# Patient Record
Sex: FEMALE | Race: WHITE
[De-identification: names, ages, dates, MRNs, and addresses within clinical notes are randomized per-mention and may not be internally consistent; named-entity substitution may affect disease eponyms.]

---

## 2018-12-01 ENCOUNTER — HOSPITAL ENCOUNTER (EMERGENCY)
Dept: HOSPITAL 25 - UCEAST | Age: 38
Discharge: HOME | End: 2018-12-01
Payer: COMMERCIAL

## 2018-12-01 VITALS — DIASTOLIC BLOOD PRESSURE: 81 MMHG | SYSTOLIC BLOOD PRESSURE: 134 MMHG

## 2018-12-01 DIAGNOSIS — F17.210: ICD-10-CM

## 2018-12-01 DIAGNOSIS — B34.9: Primary | ICD-10-CM

## 2018-12-01 DIAGNOSIS — J02.9: ICD-10-CM

## 2018-12-01 PROCEDURE — 36415 COLL VENOUS BLD VENIPUNCTURE: CPT

## 2018-12-01 PROCEDURE — 86665 EPSTEIN-BARR CAPSID VCA: CPT

## 2018-12-01 PROCEDURE — 99211 OFF/OP EST MAY X REQ PHY/QHP: CPT

## 2018-12-01 PROCEDURE — G0463 HOSPITAL OUTPT CLINIC VISIT: HCPCS

## 2018-12-01 PROCEDURE — 86308 HETEROPHILE ANTIBODY SCREEN: CPT

## 2018-12-01 PROCEDURE — 87070 CULTURE OTHR SPECIMN AEROBIC: CPT

## 2018-12-01 PROCEDURE — 86664 EPSTEIN-BARR NUCLEAR ANTIGEN: CPT

## 2018-12-01 NOTE — UC
Throat Pain/Nasal Ariel HPI





- HPI Summary


HPI Summary: 


sore throat on off for 2-3 weeks --no fevers some cough is concerned because 

son has had similar symptoms for about 5 weeks and they just got a note from 

the school nurse that a child in the school has mono








- History of Current Complaint


Chief Complaint: UCGeneralIllness


Stated Complaint: THROAT PAIN


Time Seen by Provider: 12/01/18 12:37


Hx Obtained From: Patient


Hx Last Menstrual Period: 2 weeksago


Pregnant?: No


Onset/Duration: Gradual Onset, Lasting Weeks - 2-3, Still Present


Severity: Moderate


Pain Intensity: 5


Pain Scale Used: 0-10 Numeric


Cough: Nonproductive





- Allergies/Home Medications


Allergies/Adverse Reactions: 


 Allergies











Allergy/AdvReac Type Severity Reaction Status Date / Time


 


No Known Allergies Allergy   Verified 12/01/18 12:54














PMH/Surg Hx/FS Hx/Imm Hx


Previously Healthy: Yes





- Surgical History


Surgical History: Yes


Surgery Procedure, Year, and Place: APPENDECTOMY





- Family History


Known Family History: Positive: None - noncontributory





- Social History


Occupation: Employed Full-time


Lives: With Family


Alcohol Use: Occasionally


Substance Use Type: None


Smoking Status (MU): Current Every Day Smoker


Type: Cigarettes


Amount Used/How Often: 1 PPD


Length of Time of Smoking/Using Tobacco: 20


Have You Smoked in the Last Year: Yes





Review of Systems


All Other Systems Reviewed And Are Negative: Yes


Constitutional: Positive: Negative


Skin: Positive: Negative


Eyes: Positive: Negative


ENT: Positive: Sore Throat


Respiratory: Positive: Cough


Cardiovascular: Positive: Negative


Gastrointestinal: Positive: Negative


Genitourinary: Positive: Negative


Motor: Positive: Negative


Neurovascular: Positive: Negative


Musculoskeletal: Positive: Negative


Neurological: Positive: Negative


Psychological: Positive: Negative


Is Patient Immunocompromised?: No





Physical Exam


Triage Information Reviewed: Yes


Appearance: Well-Appearing, No Pain Distress, Well-Nourished


Vital Signs: 


 Initial Vital Signs











Temp  97.1 F   12/01/18 12:49


 


Pulse  72   12/01/18 12:49


 


Resp  14   12/01/18 12:49


 


BP  134/81   12/01/18 12:49


 


Pulse Ox  97   12/01/18 12:49











Vital Signs Reviewed: Yes


Eye Exam: Normal


Eyes: Positive: Conjunctiva Clear


ENT Exam: Normal


ENT: Positive: Normal ENT inspection, Hearing grossly normal, Pharynx normal, 

TMs normal, Uvula midline.  Negative: Nasal congestion, Tonsillar swelling, 

Tonsillar exudate, Trismus, Muffled voice, Hoarse voice, Dental tenderness, 

Sinus tenderness


Dental Exam: Normal


Neck exam: Normal


Neck: Positive: Supple, Nontender


Respiratory Exam: Normal


Respiratory: Positive: Chest non-tender, Lungs clear, Normal breath sounds, No 

respiratory distress, No accessory muscle use


Cardiovascular Exam: Normal


Cardiovascular: Positive: RRR, No Murmur, Pulses Normal, Brisk Capillary Refill


Musculoskeletal Exam: Normal


Musculoskeletal: Positive: Strength Intact, ROM Intact, No Edema


Neurological Exam: Normal


Neurological: Positive: Alert, Muscle Tone Normal


Psychological Exam: Normal


Skin Exam: Normal





Throat Pain/Nasal Course/Dx





- Course


Assessment/Plan: mono test, full throat culture, increase fluids tylenol, 

ibuprofen follow with pcp prn





- Differential Dx/Diagnosis


Provider Diagnosis: 


 Pharyngitis with viral syndrome, Nicotine dependence








Discharge





- Sign-Out/Discharge


Documenting (check all that apply): Patient Departure


All imaging exams completed and their final reports reviewed: No Studies





- Discharge Plan


Condition: Stable


Disposition: HOME


Patient Education Materials:  Pharyngitis (ED), Viral Syndrome (ED)


Referrals: 


Christiano Alejandre MD [Primary Care Provider] - If Needed





- Billing Disposition and Condition


Condition: STABLE


Disposition: Home

## 2018-12-03 NOTE — PN
Progress Note





- Progress Note


Date of Service: 12/03/18


Note: 


monospot negative. please contact patient to tell of result.

## 2018-12-04 NOTE — UC
- Progress Note


Progress Note: 





EBV IGM positive despite monospot negative.


This indicates that she has a mono-like viral infection. Please refrain from 

contact sports/activities. Treatment is supportive as with mono as directed as 

last visit. Recheck if symptoms worsen or if new symptoms.





Course/Dx





- Diagnoses


Provider Diagnoses: 


 Pharyngitis with viral syndrome, Nicotine dependence








Discharge





- Sign-Out/Discharge


Documenting (check all that apply): Post-Discharge Follow Up


All imaging exams completed and their final reports reviewed: No Studies





- Discharge Plan


Condition: Stable


Disposition: HOME


Patient Education Materials:  Pharyngitis (ED), Viral Syndrome (ED)


Referrals: 


Christiano Alejandre MD [Primary Care Provider] - If Needed





- Billing Disposition and Condition


Condition: STABLE


Disposition: Home

## 2018-12-07 ENCOUNTER — HOSPITAL ENCOUNTER (EMERGENCY)
Dept: HOSPITAL 25 - UCEAST | Age: 38
Discharge: HOME | End: 2018-12-07
Payer: COMMERCIAL

## 2018-12-07 VITALS — DIASTOLIC BLOOD PRESSURE: 67 MMHG | SYSTOLIC BLOOD PRESSURE: 109 MMHG

## 2018-12-07 DIAGNOSIS — F17.210: ICD-10-CM

## 2018-12-07 DIAGNOSIS — S80.02XA: ICD-10-CM
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
## 2018-12-07 DIAGNOSIS — S83.92XA: Primary | ICD-10-CM
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
## 2018-12-07 PROCEDURE — 99212 OFFICE O/P EST SF 10 MIN: CPT

## 2018-12-07 PROCEDURE — G0463 HOSPITAL OUTPT CLINIC VISIT: HCPCS

## 2018-12-07 NOTE — UC
Knee Pain HPI





- HPI Summary


HPI Summary: 





36 yo female c/o L knee pain progressively worse s/p twist and fall while doing 

outdoor chores 2 days ago.  Landed on lateral aspect of Left knee.  Twisted 

knee right before fall.  No other injury reported.  Able to ambulate but 

painful.  Helps to elevate / wrap.  No distal p/d/w.  + eccymosis.  No known 

previous knee injury. 





- History of Current Complaint


Chief Complaint: UCLowerExtremity


Stated Complaint: KNEE INJURY


Time Seen by Provider: 12/07/18 11:54


Hx Obtained From: Patient


Hx Last Menstrual Period: 12/6/18


Pain Intensity: 7





- Allergies/Home Medications


Allergies/Adverse Reactions: 


 Allergies











Allergy/AdvReac Type Severity Reaction Status Date / Time


 


No Known Allergies Allergy   Verified 12/07/18 11:52














PMH/Surg Hx/FS Hx/Imm Hx


Previously Healthy: Yes





- Surgical History


Surgical History: Yes


Surgery Procedure, Year, and Place: APPENDECTOMY





- Family History


Known Family History: Positive: None - noncontributory





- Social History


Alcohol Use: Occasionally


Substance Use Type: None


Smoking Status (MU): Current Every Day Smoker


Type: Cigarettes


Amount Used/How Often: 1 PPD


Length of Time of Smoking/Using Tobacco: 20


Have You Smoked in the Last Year: Yes





Review of Systems


All Other Systems Reviewed And Are Negative: Yes


Constitutional: Positive: Negative


Skin: Positive: Other - see hpi


Eyes: Positive: Negative


ENT: Positive: Negative


Respiratory: Positive: Negative


Cardiovascular: Positive: Negative


Gastrointestinal: Positive: Negative


Genitourinary: Positive: Negative


Motor: Positive: Other - see hpi


Neurovascular: Positive: Other - see hpi


Musculoskeletal: Positive: Arthralgia


Neurological: Positive: Negative


Psychological: Positive: Negative


Is Patient Immunocompromised?: No





Physical Exam


Triage Information Reviewed: Yes


Appearance: Well-Appearing, Well-Nourished


Vital Signs: 


 Initial Vital Signs











Temp  98.1 F   12/07/18 11:48


 


Pulse  75   12/07/18 11:48


 


Resp  17   12/07/18 11:48


 


BP  109/67   12/07/18 11:48


 


Pulse Ox  100   12/07/18 11:48











Vital Signs Reviewed: Yes


Eye Exam: Normal


ENT Exam: Normal


Neck exam: Normal


Neck: Positive: Supple


Respiratory Exam: Normal - no tachypnea, no dyspnea.  RR normal


Cardiovascular Exam: Normal


Cardiovascular: Positive: RRR, No Murmur, Pulses Normal, Brisk Capillary Refill


Abdominal Exam: Normal


Abdomen Description: Positive: Nontender


Musculoskeletal Exam: Other - L knee + swelling + crepitus, quentin ant patella.   

+ eccymosis mid inf medial patella.  Mild fluctuant post knee, min local 

tender.  Tender lat and med rotation, not excrutiating.  + hesitation ant drawer


Neurological Exam: Normal - distal nvi.  grossly nonfocal


Psychological Exam: Normal - conversing easily and appropriately


Skin Exam: Normal - see musc skel re eccymosis





Knee Pain Course/Dx





- Course


Course Of Treatment: Reviewed xray with pt.  Offered crutches, she declines, 

will make do at home.  Considered knee immobilizer, but too straight for pt 

comfort.  Ace applied today.  Callled orthopedics - they kindly will see Ms. Jenkins next week, they will call her with appt. Suspect internal knee trauma.  

Questions as posed answered to the best of my ability.





- Differential Dx/Diagnosis


Provider Diagnosis: 


 Knee sprain, Bruise, Internal knee problem








Discharge





- Sign-Out/Discharge


Documenting (check all that apply): Patient Departure


All imaging exams completed and their final reports reviewed: Yes





- Discharge Plan


Condition: Stable


Disposition: HOME


Prescriptions: 


Ibuprofen TAB* [Motrin TAB* 600 MG] 600 mg PO Q8H PRN #30 tab


 PRN Reason: Pain


Patient Education Materials:  Knee Sprain (ED), Contusion in Adults (ED)


Forms:  *Work Release


Referrals: 


Karina Jacobo MD [Medical Doctor] - 


Additional Instructions: 


Minimize weight bearing.  


Elevate as possible.  


Seek medical attention for worse or new problems. 


Follow up with orthopedic surgeon next week. 





Consider glucosamine chondroitin supplement.  





- Billing Disposition and Condition


Condition: STABLE


Disposition: Home